# Patient Record
Sex: FEMALE | Race: OTHER | NOT HISPANIC OR LATINO
[De-identification: names, ages, dates, MRNs, and addresses within clinical notes are randomized per-mention and may not be internally consistent; named-entity substitution may affect disease eponyms.]

---

## 2022-05-27 PROBLEM — Z00.00 ENCOUNTER FOR PREVENTIVE HEALTH EXAMINATION: Status: ACTIVE | Noted: 2022-05-27

## 2022-06-01 ENCOUNTER — TRANSCRIPTION ENCOUNTER (OUTPATIENT)
Age: 22
End: 2022-06-01

## 2022-06-01 ENCOUNTER — APPOINTMENT (OUTPATIENT)
Dept: OTOLARYNGOLOGY | Facility: CLINIC | Age: 22
End: 2022-06-01

## 2022-06-01 VITALS
HEART RATE: 76 BPM | TEMPERATURE: 97.2 F | SYSTOLIC BLOOD PRESSURE: 133 MMHG | DIASTOLIC BLOOD PRESSURE: 90 MMHG | HEIGHT: 64 IN | BODY MASS INDEX: 21.34 KG/M2 | WEIGHT: 125 LBS

## 2022-06-01 DIAGNOSIS — Z83.3 FAMILY HISTORY OF DIABETES MELLITUS: ICD-10-CM

## 2022-06-01 DIAGNOSIS — Z78.9 OTHER SPECIFIED HEALTH STATUS: ICD-10-CM

## 2022-06-01 DIAGNOSIS — J34.3 HYPERTROPHY OF NASAL TURBINATES: ICD-10-CM

## 2022-06-01 DIAGNOSIS — R49.0 DYSPHONIA: ICD-10-CM

## 2022-06-01 PROCEDURE — 31231 NASAL ENDOSCOPY DX: CPT

## 2022-06-01 PROCEDURE — 99204 OFFICE O/P NEW MOD 45 MIN: CPT | Mod: 25

## 2022-06-01 RX ORDER — FEXOFENADINE HYDROCHLORIDE AND PSEUDOEPHEDRINE HYDROCHLORIDE 180; 240 MG/1; MG/1
180-240 TABLET, FILM COATED, EXTENDED RELEASE ORAL DAILY
Qty: 30 | Refills: 0 | Status: ACTIVE | COMMUNITY
Start: 2022-06-01 | End: 1900-01-01

## 2022-06-01 RX ORDER — SULFAMETHOXAZOLE AND TRIMETHOPRIM 800; 160 MG/1; MG/1
800-160 TABLET ORAL
Qty: 14 | Refills: 0 | Status: COMPLETED | COMMUNITY
Start: 2022-03-29 | End: 2022-04-08

## 2022-06-27 ENCOUNTER — OUTPATIENT (OUTPATIENT)
Dept: OUTPATIENT SERVICES | Facility: HOSPITAL | Age: 22
LOS: 1 days | End: 2022-06-27

## 2022-06-27 ENCOUNTER — APPOINTMENT (OUTPATIENT)
Dept: CT IMAGING | Facility: CLINIC | Age: 22
End: 2022-06-27

## 2022-06-27 ENCOUNTER — RESULT REVIEW (OUTPATIENT)
Age: 22
End: 2022-06-27

## 2022-06-27 PROCEDURE — 70486 CT MAXILLOFACIAL W/O DYE: CPT | Mod: 26

## 2022-07-21 ENCOUNTER — APPOINTMENT (OUTPATIENT)
Dept: OTOLARYNGOLOGY | Facility: CLINIC | Age: 22
End: 2022-07-21

## 2022-07-21 VITALS
HEIGHT: 64 IN | DIASTOLIC BLOOD PRESSURE: 77 MMHG | SYSTOLIC BLOOD PRESSURE: 111 MMHG | HEART RATE: 47 BPM | BODY MASS INDEX: 21.34 KG/M2 | WEIGHT: 125 LBS | TEMPERATURE: 97.9 F

## 2022-07-21 DIAGNOSIS — J01.91 ACUTE RECURRENT SINUSITIS, UNSPECIFIED: ICD-10-CM

## 2022-07-21 DIAGNOSIS — Z87.09 PERSONAL HISTORY OF OTHER DISEASES OF THE RESPIRATORY SYSTEM: ICD-10-CM

## 2022-07-21 DIAGNOSIS — J34.2 DEVIATED NASAL SEPTUM: ICD-10-CM

## 2022-07-21 DIAGNOSIS — H69.83 OTHER SPECIFIED DISORDERS OF EUSTACHIAN TUBE, BILATERAL: ICD-10-CM

## 2022-07-21 DIAGNOSIS — R04.0 EPISTAXIS: ICD-10-CM

## 2022-07-21 DIAGNOSIS — J32.8 OTHER CHRONIC SINUSITIS: ICD-10-CM

## 2022-07-21 PROBLEM — J34.3 HYPERTROPHY OF BOTH INFERIOR NASAL TURBINATES: Status: ACTIVE | Noted: 2022-07-21

## 2022-07-21 PROBLEM — R49.0 HOARSENESS: Status: ACTIVE | Noted: 2022-07-21

## 2022-07-21 PROCEDURE — 99214 OFFICE O/P EST MOD 30 MIN: CPT

## 2022-07-21 RX ORDER — CEFUROXIME AXETIL 250 MG/1
250 TABLET ORAL
Qty: 42 | Refills: 0 | Status: COMPLETED | COMMUNITY
Start: 2022-06-01 | End: 2022-06-22

## 2022-07-21 RX ORDER — METHYLPREDNISOLONE 4 MG/1
4 TABLET ORAL
Qty: 1 | Refills: 0 | Status: COMPLETED | COMMUNITY
Start: 2022-06-01 | End: 2022-06-08

## 2022-07-21 RX ORDER — FOLIC ACID-PYRIDOXINE-CYANOCOBALAMIN TAB 2.5-25-2 MG 2.5-25-2 MG
2.5-25-2 TAB ORAL
Qty: 90 | Refills: 0 | Status: ACTIVE | COMMUNITY
Start: 2022-06-09

## 2022-07-21 NOTE — PHYSICAL EXAM
[] : septum deviated to the left [FreeTextEntry1] : No hoarseness.  [de-identified] : Dry nasal vestibules and anterior septum bilateral. [Normal] : no neck adenopathy

## 2022-07-21 NOTE — HISTORY OF PRESENT ILLNESS
[de-identified] : Ms. QUINTANILLA is a 21 year old woman who was referred by Dr. Sellers for sinus problems.\par \par For the past 4 years whenever she gets sick, she develops a sinus infection, about 3-4 times per year. During the sinus infections, she feels pain above her left eye, has left ear pain and postnasal drip. Gets hoarseness from postnasal drip. Sx worse in 2021.  Last sinus infection was August 2021.\par Runny nose chronic s/p rhinoplasty 2017.  Nasal congestion x past few weeks.  Sense of smell not changed. \par Unsure if she has seasonal or other allergies. Congestion is usually worse in the winter. No pets at home.  Has had itching all over her body.\par Had the same symptoms when living in [place]. \par She has been travelling by airplane every 2 weeks.\par She has difficulty equalizing ear pressure on both side, when flying and when not.  \par No HL, vertigo, tinnitus.\par Tried Claritin-D and Neti pot. She tried Flonase but it caused nasal pain. Took Bactrim at end of March 2022.\par

## 2022-07-21 NOTE — END OF VISIT
[FreeTextEntry3] : All medical record entries made by the Scribe were at my, Dr. Khadijah Davis, direction and personally dictated by me on 06/01/2022. I have reviewed the chart and agree that the record accurately reflects my personal performance of the history, physical exam, assessment and plan. I have also personally directed, reviewed, and agreed with the chart.  [Time Spent: ___ minutes] : I have spent [unfilled] minutes of time on the encounter.

## 2022-07-21 NOTE — PROCEDURE
[FreeTextEntry6] : \par Indication: allergic rhinitis, deviated nasal septum.  I cannot see past the left deviated nasal septum on anterior rhinoscopy.\par - Verbal consent was obtained from patient prior to exam.  \par - Lino-Synephrine and lidocaine 2% spray was applied to nose bilaterally.\par Findings:\par - Inferior turbinates were edematous with mild erythema bilateral. Inferior meatus clear bilateral.\par - Septum was deviated to the left lateralizing the middle turbinate.\par - No polyps either side of nose.  Sticky mucus stranding in both nasal cavities.\par - Left middle turbinate compressed by left deviated septum. Right MT and superior turbinates normal bilateral.\par - Middle meatus congested bilateral. Yellow purulent mucus from left middle meatus. SER clear on right; unable to see on left. \par - Nasopharynx without mass or exudate.  Adenoids were small\par - Eustachian orifices were mildly edematous bilateral.\par \par The patient tolerated the procedure well. \par

## 2022-07-21 NOTE — ASSESSMENT
[FreeTextEntry1] : Ms. QUINTANILLA is a 21 year old woman who has the following issues:\par \par 1.) Recurrent acute and chronic sinusitis. She has 3-4 infections/year for the past 4 years, with last infection treated with antibiotics in August 2021. Sx became more severe in 2021.  Some of her sx persist after acute infection resolves.  Currently with purulent mucus from left middle meatus, which is compressed by deviated nasal septum.\par --> Cefuroxime x 3 weeks\par --> Methylprednisolone Dosepak \par --> Nasonex daily \par --> Saline nasal spray/irrigations\par --> CT sinuses to be done after antibiotics\par \par 2.) Allergic rhinitis and inferior turbinate hypertrophy \par --> Referral to allergist\par --> Try Allegra D instead of Claritin D\par \par 3.) Eustachian tube dysfunction related to postnasal drip and chronic sinusitis.\par --> Take decongestant the night before flying plus on the morning of.\par \par 4.) Hoarseness due to postnasal drip \par \par Return after CT.\par

## 2022-07-21 NOTE — ASSESSMENT
[FreeTextEntry1] : Ms. QUINTANILLA has resolution of chronic sinusitis that arose from a recurrent acute sinusitis episode.  She completed a 3 week antibiotic course, in addition to oral steroids.   \par Eustachian tube function is also improved with the sinus treatment.\par CT scan shows mostly clear paranasal sinuses.  The left deviated nasal septum contributes to narrowed sinus outflow. The ostiomeatal units are patent but narrowed, so she is more prone to developing sinusitis when she has swelling from URI.\par She has no current difficulty breathing through her nose.\par Allergy evaluation showed no positive allergens on skin tests.\par She has had epistaxis due to dry nasal mucosa after Nasonex use. No bleeding x past 5 days.  She has dry nasal vestibules today.\par \par \par PLAN\par --> decongestant nasal spray for a few days when gets URI\par --> Nasonex PRN congestion\par --> Steam inhalation when she gets URI\par --> Sudafed for plane flight and URI\par --> Cold cream or Aquaphor to nostrils 1-2x/day for next few days to treat dryness.\par --> If recurrent acute sinusitis becomes an issue again, consider sinus surgery and septoplasty\par \par I would be happy to see her again as needed. \par

## 2022-07-21 NOTE — PHYSICAL EXAM
[Midline] : trachea located in midline position [Normal] : parotid gland, submandibular gland and thyroid glands are normal [Nasal Endoscopy Performed] : nasal endoscopy was performed, see procedure section for findings [] : septum deviated to the left [FreeTextEntry1] : Mild hoarseness.  [de-identified] : 1+ bilateral  [de-identified] : Carotid pulses 2+ bilateral.

## 2022-07-21 NOTE — REVIEW OF SYSTEMS
[Patient Intake Form Reviewed] : Patient intake form was reviewed [Negative] : Heme/Lymph [As Noted in HPI] : as noted in HPI [FreeTextEntry8] : UTI [de-identified] : Rashes/hives

## 2022-07-21 NOTE — ADDENDUM
[FreeTextEntry1] : Documented by Estefania Knight acting as a scribe for Dr. Khadijah Davis on 06/01/2022.

## 2022-07-21 NOTE — HISTORY OF PRESENT ILLNESS
[de-identified] : Ms. QUINTANILLA was seen in follow up for recurrent acute and chronic sinusitis.\par After taking antibiotics x 3 weeks and oral steroids, she had resolution of sinus sx.  No runny nose, congestion, hoarseness or facial pressure.  She feels good.  She had CT sinus scan.\par She has been getting epistaxis from the left nostril after using Nasonex; last bled 5 days ago and lasted 5 minutes. She stopped using Nasonex last week.\par She saw Dr. Cosme for allergy eval.  Pin prick and intradermal allergy testing were negative. \par She still gets ear pressure on airplanes but is able to pop ears easily.\par \par INITIAL VISIT 6/01/2022\par For the past 4 years whenever she gets sick, she develops a sinus infection, about 3-4 times per year. During the sinus infections, she feels pain above her left eye, has left ear pain and postnasal drip. Gets hoarseness from postnasal drip. Sx worse in 2021. Last sinus infection was August 2021.\par Runny nose chronic s/p rhinoplasty 2017. Nasal congestion x past few weeks. Sense of smell not changed. \par Unsure if she has seasonal or other allergies. Congestion is usually worse in the winter. No pets at home. Has had itching all over her body.\par Had the same symptoms when living in [place]. \par She has been travelling by airplane every 2 weeks.\par She has difficulty equalizing ear pressure on both side, when flying and when not. \par No HL, vertigo, tinnitus.\par Tried Claritin-D and Neti pot. She tried Flonase but it caused nasal pain. Took Bactrim at end of March 2022.\par \par \par CT SINUS noncontrast (6/27/2022) at [facility] \par - Prior Studies:   None available.\par *  Prior Surgery:  Defects in the bilateral nasal bones and maxillary frontal processes post prior trauma or rhinoplasty.\par *  Sinuses:  Mucosal thickening along the floor of the left sphenoid sinus. The paranasal sinuses are otherwise fully ventilated.\par *  Sinocranial and Sino-orbital Junctions: The anterior skull base and orbital walls are intact. Pneumatization of the timoteo vinh from the right frontal sinus.\par *  Ostiomeatal Units:  Patent bilaterally, though narrowed by pneumatization of ethmoid orbital plates and by prominent ethmoid bullae.\par *  Frontal Sinus Outflow Tracts:  Patent.\par *  Sphenoethmoidal Recesses:  Clear.\par *  Maxillary Teeth:  No apparent site of contributory odontogenic inflammation.\par *  Nasal Cavity/Nasopharynx:  Severe leftward septal deviation with a broad bony septal spur that contacts the left inferior and middle turbinates and the left lateral nasal wall. The middle turbinates are hypoplastic bilaterally with a mild degree of pneumatization on the right side. The right inferior turbinate is hypertrophic. There is no nasal or nasopharyngeal mass.\par *  Orbits:  Normal.\par *  Brain:  No hydrocephalus or large intracranial mass lesion.\par *  Mastoids:  Clear\par IMPRESSION:\par 1.) Currently no evidence of active sinus inflammatory disease. \par 2.) Severe leftward septal deviation with a broad contact to the left middle and inferior turbinates and the lateral nasal wall.\par 3.) Ostiomeatal units are bilaterally narrow secondary to ethmoid variation, as above.\par (Images were reviewed with the patient.)\par

## 2023-06-22 ENCOUNTER — EMERGENCY (EMERGENCY)
Facility: HOSPITAL | Age: 23
LOS: 1 days | Discharge: HOME | End: 2023-06-22
Attending: EMERGENCY MEDICINE | Admitting: EMERGENCY MEDICINE
Payer: COMMERCIAL

## 2023-06-22 VITALS
SYSTOLIC BLOOD PRESSURE: 117 MMHG | DIASTOLIC BLOOD PRESSURE: 79 MMHG | RESPIRATION RATE: 18 BRPM | WEIGHT: 121.92 LBS | TEMPERATURE: 99 F | OXYGEN SATURATION: 99 % | HEART RATE: 78 BPM

## 2023-06-22 DIAGNOSIS — Z91.018 ALLERGY TO OTHER FOODS: ICD-10-CM
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
## 2023-06-22 DIAGNOSIS — M79.672 PAIN IN LEFT FOOT: ICD-10-CM
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
## 2023-06-22 DIAGNOSIS — S93.602A UNSPECIFIED SPRAIN OF LEFT FOOT, INITIAL ENCOUNTER: ICD-10-CM

## 2023-06-22 PROCEDURE — 99284 EMERGENCY DEPT VISIT MOD MDM: CPT

## 2023-06-22 PROCEDURE — 73630 X-RAY EXAM OF FOOT: CPT | Mod: 26,LT

## 2023-06-22 NOTE — ED ADULT NURSE NOTE - OBJECTIVE STATEMENT
Pt walked in c/o of L ankle pain after twisting her ankle on Sunday. A&Ox3 speaking in full sentences. Ambulatory with steady gait.

## 2023-06-22 NOTE — ED PROVIDER NOTE - PATIENT PORTAL LINK FT
You can access the FollowMyHealth Patient Portal offered by [facility] by registering at the following website: [email or web address]. By joining [facility]’s FollowMyHealth portal, you will also be able to view your health information using other applications (apps) compatible with our system.

## 2023-06-22 NOTE — ED PROVIDER NOTE - PHYSICAL EXAMINATION
VITAL SIGNS: I have reviewed nursing notes and confirm.  CONST: Well-developed; well-nourished; No apparent distress.  SKIN: Slight swelling to L lateral foot  MSK: Minimal ttp to L lateral foot.   PSYCH: Cooperative. Appropriate mood, language, and behavior.

## 2023-06-22 NOTE — ED PROVIDER NOTE - CARE PROVIDER_API CALL
Jayden Gomez  Orthopaedic Surgery  [address] [place] [address], [address]  [place] [address]  Phone: [phone]  Fax: [phone]  Follow Up Time:

## 2023-06-22 NOTE — ED PROVIDER NOTE - OBJECTIVE STATEMENT
22-year-old woman presenting with pain to the left lateral foot for the past 4 days.  She was stepping into a horse stirrup 4 days ago and twisted her left foot.  It got briefly tangled in the stirrup.  She has a progressive increase in pain as she is walking.  The pain has become sharp when she thought she should come in to make sure she did not have a slight fracture.  She did not take anything for pain and has declined anything for pain.  She is able to walk on the foot.

## 2023-06-22 NOTE — ED PROVIDER NOTE - WR ORDER ID 1
I would advise patient have leg evaluated--here or  potentially by her orthopedist.  Increased swelling related to stopping the diuretic would be expected to be bilaterally and not isolated to just one leg. [id number]

## 2023-06-22 NOTE — ED PROVIDER NOTE - NSFOLLOWUPINSTRUCTIONS_ED_ALL_ED_FT
Foot Sprain    A sprain is a stretch or tear in one of the tough, fiber-like tissues (ligaments) in your body. This is caused by an injury to the area such as a twisting mechanism. Symptoms include pain, swelling, or bruising. Rest that area over the next several days and slowly resume activity when tolerated. Ice can help with swelling and pain.     SEEK IMMEDIATE MEDICAL CARE IF YOU HAVE ANY OF THE FOLLOWING SYMPTOMS: worsening pain, inability to move that body part, numbness or tingling.    You can follow-up with orthopedics as needed, Dr. Gomez is on-call today.  He likely has offices in the city.